# Patient Record
Sex: MALE | Race: BLACK OR AFRICAN AMERICAN
[De-identification: names, ages, dates, MRNs, and addresses within clinical notes are randomized per-mention and may not be internally consistent; named-entity substitution may affect disease eponyms.]

---

## 2023-10-05 ENCOUNTER — HOSPITAL ENCOUNTER (EMERGENCY)
Dept: HOSPITAL 97 - ER | Age: 48
Discharge: HOME | End: 2023-10-05
Payer: SELF-PAY

## 2023-10-05 VITALS — OXYGEN SATURATION: 98 % | SYSTOLIC BLOOD PRESSURE: 143 MMHG | DIASTOLIC BLOOD PRESSURE: 98 MMHG

## 2023-10-05 VITALS — TEMPERATURE: 98.1 F

## 2023-10-05 DIAGNOSIS — S61.012A: Primary | ICD-10-CM

## 2023-10-05 DIAGNOSIS — Z72.0: ICD-10-CM

## 2023-10-05 DIAGNOSIS — Z23: ICD-10-CM

## 2023-10-05 PROCEDURE — 90471 IMMUNIZATION ADMIN: CPT

## 2023-10-05 PROCEDURE — 0HQGXZZ REPAIR LEFT HAND SKIN, EXTERNAL APPROACH: ICD-10-PCS

## 2023-10-05 PROCEDURE — 99284 EMERGENCY DEPT VISIT MOD MDM: CPT

## 2023-10-05 NOTE — EDPHYS
Physician Documentation                                                                           

 [facility]                                                                 

Name: Kyle Pacheco                                                                             

Age: 48 yrs                                                                                       

Sex: Male                                                                                         

: 1975                                                                                   

MRN: [id number]                                                                                   

Arrival Date: 10/05/2023                                                                          

Time: 21:10                                                                                       

Account#: [id number]                                                                            

Bed 16                                                                                            

Private MD:                                                                                       

ED Physician Patricio Garg                                                                    

HPI:                                                                                              

10/05                                                                                             

21:15 This 48 yrs old Black Male presents to ER via Un[facility] with complaints of LACERATION  sp4 

      TO FINGER.                                                                                  

22:17 Patient presents with acute left thumb laceration on the palmar side over the left      sp4 

      thumb about 3 cm long, associated with moderate bleeding reported to be moderate to         

      heavy bleeding in triage. Patient states he was using a table saw that nicked his left      

      thumb. He reports thumb is still sensitive and he can move it. On arrival bleeding is       

      controlled with pressure dressing. Patient is not sure when the last tetanus shot was.      

      Denies any history of significant medical problem.                                          

                                                                                                  

Historical:                                                                                       

- Allergies:                                                                                      

21:17 No Known Allergies;                                                                     cm10

- Home Meds:                                                                                      

21:17 None [Active];                                                                          cm10

- PMHx:                                                                                           

21:17 None;                                                                                   cm10

- PSHx:                                                                                           

21:17 None;                                                                                   cm10

                                                                                                  

- Immunization history:: Adult Immunizations unknown, Last tetanus immunization:                  

  unknown.                                                                                        

- Social history:: Smoking status: Patient reports the use of cigarette tobacco                   

  products, cigars.                                                                               

- Family history:: not pertinent.                                                                 

                                                                                                  

                                                                                                  

ROS:                                                                                              

22:17 Constitutional: Negative for fever, chills, and weight loss, MS/Extremity: Positive     sp4 

      Left thumb laceration palmar side of the left thumb with moderate bleeding                  

22:17 All other systems are negative,                                                             

                                                                                                  

Exam:                                                                                             

22:17 Constitutional:  This is a well developed, well nourished patient who is awake, alert,  sp4 

      and in no acute distress. Head/Face:  Normocephalic, atraumatic. Eyes:  Pupils equal        

      round and reactive to light, extra-ocular motions intact.  Lids and lashes normal.          

      Conjunctiva and sclera are not injected.  Cornea within normal limits.  Periorbital         

      areas with no swelling, redness, or edema. ENT:  Nares patent. No nasal discharge, no       

      septal abnormalities noted.  Tympanic membranes are normal and external auditory canals     

      are clear.  Oropharynx with no redness, swelling, or masses, exudates, or evidence of       

      obstruction, uvula midline.  Mucous membranes moist. Neck:  Trachea midline, no             

      thyromegaly or masses palpated, and no cervical lymphadenopathy.  Supple, full range of     

      motion without nuchal rigidity, or vertebral point tenderness.  Chest/axilla:  Normal       

      chest wall appearance and motion.  Nontender with no deformity.  No lesions are             

      appreciated. Cardiovascular:  Regular rate and rhythm with a normal S1 and S2.  No          

      gallops, murmurs, or rubs.  Normal PMI, no JVD.  No pulse deficits. Respiratory:  Lungs     

      have equal breath sounds bilaterally, clear to auscultation and percussion.  No rales,      

      rhonchi or wheezes noted.  No increased work of breathing, no retractions or nasal          

      flaring. Abdomen/GI:  Soft, non-tender, with normal bowel sounds.  No distension or         

      tympany.  No guarding or rebound.  No evidence of tenderness throughout. Back:  No          

      spinal tenderness.  No costovertebral tenderness.  Skin:  Warm, dry with normal turgor.     

       Normal color with no rashes, no lesions, and no evidence of cellulitis.   Positive for     

      left thumb laceration MS/ Extremity:  Pulses equal, no cyanosis.  Neurovascular intact.     

       Full, normal range of motion.   Positive for left thumb laceration spanning proximal       

      phalanx of the left thumb, laceration has longitudinal on the palmar side of the left       

      thumb.   On exam there is bleeding that is controlled with pressure direct.  Appear to      

      still have small arterial laceration over the left thumb.   No sign of digital artery       

      laceration.   Neuro:  Awake and alert, GCS 15, oriented to person, place, time, and         

      situation.  Cranial nerves II-XII grossly intact.  Motor strength 5/5 in all                

      extremities.  Sensory grossly intact.  Psych:  Awake, alert, with orientation to            

      person, place and time.  Behavior, mood, and affect are within normal limits                

                                                                                                  

Vital Signs:                                                                                      

21:15  / 95; Pulse 84; Resp 18 S; Temp 98.1(TE); Pulse Ox 98% on R/A; Weight 92.99 kg   cm10

      (R); Height 5 ft. 9 in. (R); Pain 6/10;                                                     

21:30  / 96; Pulse 91; Resp 18 S; Pulse Ox 99% on R/A;                                  ha1 

22:39  / 98; Pulse 76; Resp 18 S; Pulse Ox 98% on R/A;                                  ha1 

21:15 Body Mass Index 30.27 (92.99 kg, 175.26 cm)                                             cm10

21:15 Pain Scale: Adult                                                                       cm10

                                                                                                  

Laceration:                                                                                       

22:17 Wound Repair of 3cm ( 1.2in ) subcutaneous laceration to palmar aspect of proximal      sp4 

      phalanx of left thumb, 3 cm laceration proximal phalanx left thumb palmar side of the       

      left thumb longitudinal orientation, small arterial bleeding present also there is flap     

      . Moderate bleeding, small flap component to laceration, tendon and ligament function       

      intact. Distal neuro/vascular/tendon intact. Anesthesia: Digital block administered         

      with 10 mls of 1% lidocaine. Wound prep: Moderate cleansing by me, Wound irrigation by      

      me, Wound explored moderately, Copious irrigation. Skin closed with 10 4-0 Silk using       

      interrupted sutures and sterile technique. Dressed with 4x4's, Kerlix, non-adherent         

      dressing. Patient tolerated well.                                                           

                                                                                                  

MDM:                                                                                              

21:17 Patient medically screened.                                                             sp4 

22:17 Differential Diagnosis Laceration, puncture wound, tendon injury, nerve injury,         sp4 

      arterial laceration. Data reviewed: vital signs, nurses notes, old medical records. ED      

      course: Laceration was repaired with silk sutures, patient advised to keep sutures for      

      the next 20 days. If silk sutures would not fill out by themselves and sutures should       

      be removed after 20 days here in ER or primary care MD office. Advised daily dressing       

      changes. Will prescribe as needed ibuprofen and tramadol short course for the next 3        

      days. .                                                                                     

                                                                                                  

10/05                                                                                             

21:26 Order name: Dressing - Wound; Complete Time: 21:27                                      sp4 

10/05                                                                                             

21:26 Order name: Gloves, Sterile; Complete Time: 21:27                                       sp4 

10/05                                                                                             

21:26 Order name: Setup Suture Tray; Complete Time: 21:27                                     sp4 

                                                                                                  

Administered Medications:                                                                         

21:37 Drug: Tetanus-Diphtheria Toxoid IM Adult 0.5 ml IM once; Provide Vaccine Information    ha1 

      Statement (VIS). {: [employer]; Exp: Mon May 05 2025; Lot #: [id number];        

      Series: 1 of 1; Patient Consent: Obtained; Date/Time: 2023/10/05; Source Name: Kyle Pacheco; Source Relationship: Self; Address Information: [address] [place] [address]; Phone Number: [phone]; Education: Provided; VIS Presented Date:     

      2023/10/05; VIS Publication: Tetanus/Diphtheria (Td) VIS 2014 (historic)} Route:      

      IM; Site: right deltoid;                                                                    

22:12 Follow up: Response: No adverse reaction                                                ha1 

22:12 Drug: Lidocaine Infiltration (1 %) 20 ml 20 ml Infiltration once; to bedside {Note:     ha1 

      administered by Dr. Patricio BURNS} Volume: 20 ml; Route: Infiltration;                         

22:43 Follow up: Response: No adverse reaction                                                ha1 

22:20 Drug: Ibuprofen  mg PO once Route: PO;                                            ha1 

22:43 Follow up: Response: No adverse reaction; Pain is decreased                             ha1 

22:20 Drug: traMADol  mg PO once Route: PO;                                             ha1 

22:43 Follow up: Response: No adverse reaction; Pain is decreased                             ha1 

                                                                                                  

                                                                                                  

Disposition Summary:                                                                              

10/05/23 22:24                                                                                    

Discharge Ordered                                                                                 

 

      Problem: new                                                                            sp4 

      Symptoms: have improved                                                                 sp4 

      Condition: Stable                                                                       sp4 

      Diagnosis                                                                                   

        - Laceration without foreign body of left thumb without damage to nail, initial       sp4 

      encounter                                                                                   

      Followup:                                                                               sp4 

        - With: Private Physician                                                                  

        - When: after 20 days , in case silk sutures still present                                 

        - Reason: Recheck today's complaints                                                       

      Discharge Instructions:                                                                     

        - Discharge Summary Sheet                                                             sp4 

        - Laceration Care, Adult, Easy-to-Read                                                sp4 

      Forms:                                                                                      

        - Patient Portal Instructions                                                         sp4 

      Prescriptions:                                                                              

        - Ibuprofen 800 mg Oral Tablet                                                             

            - take 1 tablet ORAL route every 8 hours As needed take with food; 30 tablet;     sp4 

      Refills: 0, Product Selection Permitted                                                     

        - Tramadol 50 mg Oral Tablet                                                               

            - take 1 tablet ORAL route every 8 hours as needed; 12 tablet; Refills: 0,        sp4 

      Product Selection Permitted                                                                 

Signatures:                                                                                       

Taylor Marie RN RN   ha1                                                  

Patricio Garg MD MD   sp4                                                  

Jemma Patel RN                  RN   cm10                                                 

                                                                                                  

**************************************************************************************************

## 2023-10-05 NOTE — ER
Nurse's Notes                                                                                     

 [facility]                                                                 

Name: Kyle Pacheco                                                                             

Age: 48 yrs                                                                                       

Sex: Male                                                                                         

: 1975                                                                                   

MRN: [id number]                                                                                   

Arrival Date: 10/05/2023                                                                          

Time: 21:10                                                                                       

Account#: [id number]                                                                            

Bed 16                                                                                            

Private MD:                                                                                       

Diagnosis: Laceration without foreign body of left thumb without damage to nail, initial encounter

                                                                                                  

Presentation:                                                                                     

10/05                                                                                             

21:15 Chief complaint: Patient states: laceration to left thumb. pt states that he cut        cm10

      himself with an electric saw. Coronavirus screen: Vaccine status: Patient reports           

      receiving the 2nd dose of the covid vaccine. Ebola Screen: Patient denies travel to an      

      Ebola-affected area in the 21 days before illness onset. No symptoms or risks               

      identified at this time. Initial Sepsis Screen: Does the patient meet any 2 criteria?       

      No. Patient's initial sepsis screen is negative. Does the patient have a suspected          

      source of infection? No. Patient's initial sepsis screen is negative. Risk Assessment:      

      Do you want to hurt yourself or someone else? Patient reports no desire to harm self or     

      others. Onset of symptoms was 2023.                                             

21:15 Method Of Arrival: Ambulatory                                                           cm10

21:15 Acuity: SVITLANA 4                                                                           cm10

                                                                                                  

Historical:                                                                                       

- Allergies:                                                                                      

21:17 No Known Allergies;                                                                     cm10

- Home Meds:                                                                                      

21:17 None [Active];                                                                          cm10

- PMHx:                                                                                           

21:17 None;                                                                                   cm10

- PSHx:                                                                                           

21:17 None;                                                                                   cm10

                                                                                                  

Historical Immunization:                                                                          

 - Administered Vaccines                                                                          

22:20 Ibuprofen  mg 2023/10/05                                                          ha1 

                                                                                                  

22:20 traMADol  mg 2023/10/05                                                           ha1 

                                                                                                  

22:12 Lidocaine Infiltration (1 %) 20 ml 2023/10/05                                           ha1 

                                                                                                  

21:37 Tetanus-Diphtheria Toxoid IM Adult 0.5 ml 2023/10/05                                    ha1 

      : [employer]; Exp: Mon May 05 2025; Lot #: [id number]; Series: 1 of 1;          

      Patient Consent: Obtained; Date/Time: 2023/10/05; Source Name: Kyle Pacheco;        

      Source Relationship: Self; Address Information: [address] [place] [address];       

      Phone Number: [phone]; Education: Provided; VIS Presented Date: 2023/10/05; VIS        

      Publication: Tetanus/Diphtheria (Td) VIS 2014 (historic)                              

- Immunization history:: Adult Immunizations unknown, Last tetanus immunization:                  

  unknown.                                                                                        

- Social history:: Smoking status: Patient reports the use of cigarette tobacco                   

  products, cigars.                                                                               

- Family history:: not pertinent.                                                                 

                                                                                                  

                                                                                                  

Screenin:18 [facility] ED Fall Risk Assessment (Adult) History of falling in the last 3 months,       ha1 

      including since admission No falls in past 3 months (0 pts) Confusion or Disorientation     

      No (0 pts) Intoxicated or Sedated No (0 pts) Impaired Gait No (0 pts) Mobility Assist       

      Device Used No (0 pt) Score/Fall Risk Level 0 - 2 = Low Risk Oriented to surroundings,      

      Maintained a safe environment, Educated pt \T\ family on fall prevention, incl call for     

      assistance when getting out of bed. Abuse screen: Denies threats or abuse. Denies           

      injuries from another. Nutritional screening: No deficits noted. Tuberculosis               

      screening: No symptoms or risk factors identified.                                          

                                                                                                  

Assessment:                                                                                       

21:18 General: Appears uncomfortable, Behavior is calm, cooperative. Pain: Complains of pain  ha1 

      in left hand Pain does not radiate. Pain currently is 8 out of 10 on a pain scale.          

      Quality of pain is described as throbbing. Neuro: Level of Consciousness is awake,          

      alert, obeys commands, Oriented to person, place, time, situation. Cardiovascular:          

      Capillary refill < 3 seconds Patient's skin is warm and dry. Respiratory: Airway is         

      patent Respiratory effort is even, unlabored, Respiratory pattern is regular,               

      symmetrical. GI: No signs and/or symptoms were reported involving the gastrointestinal      

      system. Derm: Skin is moist, Skin is normal. Musculoskeletal: Circulation, motion, and      

      sensation intact. Range of motion: intact in all extremities. Injury Description:           

      Laceration sustained to dorsal aspect of proximal phalanx of left thumb is clean, 2.6       

      to 7.5 cm long.                                                                             

22:15 Reassessment: Patient and/or family updated on plan of care and expected duration. Pain ha1 

      level reassessed. Patient is alert, oriented x 3, equal unlabored respirations, skin        

      warm/dry/pink.                                                                              

                                                                                                  

Vital Signs:                                                                                      

21:15  / 95; Pulse 84; Resp 18 S; Temp 98.1(TE); Pulse Ox 98% on R/A; Weight 92.99 kg   cm10

      (R); Height 5 ft. 9 in. (R); Pain 6/10;                                                     

21:30  / 96; Pulse 91; Resp 18 S; Pulse Ox 99% on R/A;                                  ha1 

22:39  / 98; Pulse 76; Resp 18 S; Pulse Ox 98% on R/A;                                  ha1 

21:15 Body Mass Index 30.27 (92.99 kg, 175.26 cm)                                             cm10

21:15 Pain Scale: Adult                                                                       cm10

                                                                                                  

ED Course:                                                                                        

21:12 Patient arrived in ED.                                                                  ag3 

21:15 Patricio Garg MD is Attending Physician.                                           sp4 

21:17 Triage completed.                                                                       cm10

21:18 Arm band placed on Patient placed in an exam room, on a stretcher.                      cm10

21:18 Patient has correct armband on for positive identification. Bed in low position. Call   ha1 

      light in reach. Side rails up X 1. Adult w/ patient.                                        

21:19 Taylor Marie RN is Primary Nurse.                                                      ha1 

22:41 No provider procedures requiring assistance completed. Patient did not have IV access   ha1 

      during this emergency room visit.                                                           

22:42 Provided Education on: follow up for suture removal and wound care.                     ha1 

                                                                                                  

Administered Medications:                                                                         

21:37 Drug: Tetanus-Diphtheria Toxoid IM Adult 0.5 ml IM once; Provide Vaccine Information    ha1 

      Statement (VIS). {: [employer]; Exp: Mon May 05 2025; Lot #: [id number];        

      Series: 1 of 1; Patient Consent: Obtained; Date/Time: 2023/10/05; Source Name: Kyle Pacheco; Source Relationship: Self; Address Information: [address] [place] [address]; Phone Number: [phone]; Education: Provided; VIS Presented Date:     

      2023/10/05; VIS Publication: Tetanus/Diphtheria (Td) VIS 2014 (historic)} Route:      

      IM; Site: right deltoid;                                                                    

22:12 Follow up: Response: No adverse reaction                                                ha1 

22:12 Drug: Lidocaine Infiltration (1 %) 20 ml 20 ml Infiltration once; to bedside {Note:     ha1 

      administered by Dr. Patricio BURNS} Volume: 20 ml; Route: Infiltration;                         

22:43 Follow up: Response: No adverse reaction                                                ha1 

22:20 Drug: Ibuprofen  mg PO once Route: PO;                                            ha1 

22:43 Follow up: Response: No adverse reaction; Pain is decreased                             ha1 

22:20 Drug: traMADol  mg PO once Route: PO;                                             ha1 

:43 Follow up: Response: No adverse reaction; Pain is decreased                             ha1 

                                                                                                  

                                                                                                  

Medication:                                                                                       

22:42 Vaccine Information Statement (VIS) provided today. Questions and/or concerns           ha1 

      addressed. VIS edition date: 2023.                                              

                                                                                                  

Outcome:                                                                                          

22:24 Discharge ordered by MD.                                                                amando 

:41 Discharged to home ambulatory, with family,                                             ha1 

:41 Condition: stable                                                                           

22:41 Discharge instructions given to patient, family, Instructed on discharge instructions,      

      follow up and referral plans. medication usage, Demonstrated understanding of               

      instructions, follow-up care, medications, Prescriptions given X 2,                         

:43 Patient left the ED.                                                                    ha1 

                                                                                                  

Signatures:                                                                                       

Mirna Arriaga                                 ag3                                                  

Taylor Marie, LUIS                        RN   ha1                                                  

Patricio Garg MD MD   sp4                                                  

Jemma Patel RN                  RN   cm10                                                 

                                                                                                  

Corrections: (The following items were deleted from the chart)                                    

 21:18 Injury Description: Laceration sustained to dorsal aspect of proximal phalanx of  ha1 

      left thumb ha1                                                                              

                                                                                                  

**************************************************************************************************